# Patient Record
Sex: MALE | Race: WHITE | Employment: FULL TIME | URBAN - NONMETROPOLITAN AREA
[De-identification: names, ages, dates, MRNs, and addresses within clinical notes are randomized per-mention and may not be internally consistent; named-entity substitution may affect disease eponyms.]

---

## 2020-11-11 RX ORDER — FENOFIBRATE 145 MG/1
145 TABLET, COATED ORAL DAILY
COMMUNITY

## 2020-11-11 RX ORDER — OLMESARTAN MEDOXOMIL 40 MG/1
40 TABLET ORAL DAILY
COMMUNITY

## 2020-11-11 RX ORDER — ALBUTEROL SULFATE 90 UG/1
2 AEROSOL, METERED RESPIRATORY (INHALATION) EVERY 6 HOURS PRN
COMMUNITY

## 2020-11-11 RX ORDER — ATORVASTATIN CALCIUM 20 MG/1
20 TABLET, FILM COATED ORAL DAILY
COMMUNITY

## 2020-11-11 RX ORDER — FLUTICASONE PROPIONATE 50 MCG
2 SPRAY, SUSPENSION (ML) NASAL DAILY
COMMUNITY

## 2020-11-11 RX ORDER — SILDENAFIL 100 MG/1
100 TABLET, FILM COATED ORAL
COMMUNITY

## 2020-11-11 RX ORDER — BACLOFEN 20 MG/1
20 TABLET ORAL 3 TIMES DAILY
COMMUNITY

## 2020-11-11 RX ORDER — HYDROCHLOROTHIAZIDE 25 MG/1
25 TABLET ORAL DAILY
COMMUNITY

## 2020-11-11 RX ORDER — AMLODIPINE BESYLATE 10 MG/1
10 TABLET ORAL DAILY
COMMUNITY

## 2020-11-11 RX ORDER — ERGOCALCIFEROL 1.25 MG/1
50000 CAPSULE ORAL WEEKLY
COMMUNITY

## 2020-11-11 RX ORDER — ETODOLAC 400 MG/1
400 TABLET, FILM COATED ORAL 2 TIMES DAILY
COMMUNITY

## 2020-11-11 RX ORDER — OMEPRAZOLE 40 MG/1
40 CAPSULE, DELAYED RELEASE ORAL DAILY
COMMUNITY

## 2020-11-23 ENCOUNTER — INITIAL CONSULT (OUTPATIENT)
Dept: SURGERY | Age: 60
End: 2020-11-23
Payer: COMMERCIAL

## 2020-11-23 VITALS
HEART RATE: 98 BPM | BODY MASS INDEX: 31.47 KG/M2 | SYSTOLIC BLOOD PRESSURE: 138 MMHG | WEIGHT: 171 LBS | HEIGHT: 62 IN | TEMPERATURE: 98 F | DIASTOLIC BLOOD PRESSURE: 66 MMHG

## 2020-11-23 PROCEDURE — 99203 OFFICE O/P NEW LOW 30 MIN: CPT | Performed by: SURGERY

## 2020-11-23 RX ORDER — BISACODYL 5 MG
TABLET, DELAYED RELEASE (ENTERIC COATED) ORAL
Qty: 4 TABLET | Refills: 0 | Status: SHIPPED | OUTPATIENT
Start: 2020-11-23

## 2020-11-23 RX ORDER — POLYETHYLENE GLYCOL 3350 17 G/17G
POWDER, FOR SOLUTION ORAL
Qty: 238 G | Refills: 0 | Status: SHIPPED | OUTPATIENT
Start: 2020-11-23

## 2020-11-23 NOTE — PROGRESS NOTES
[facility] General Surgery   History & Physical  Nisha Nicholas DO  Pt Name: Yvonne Floyd  MRN: [id number]  YOB: 1960  Date of evaluation: 11/23/2020  Primary Care Physician: CARLEE Chadwick - CNP    Chief Complaint:   Chief Complaint   Patient presents with    Abdominal Pain     llq pain-x 5 days-as stopped since         SUBJECTIVE:    History of Present Illness: This is a 61 y.o.  male who presents for evaluation for multiple issues. Pt has a history of alternating constipation/diarrhea/soft stool for several years. Current smoker 1ppd. Had cscope >10yrs ago, does not recall any significant findings, no reports to review at this time. Pt does have intermittent blood per rectum when he is constipation, this resolves on its own. No [address] colorectal CA. Pt also reports longstanding heartburn controlled on prilosec, he has been on this medication for at least 15yrs. Smoker 1ppd, drinks on bottle soda daily and 2 cups coffee daily. Social beer drinker. Pt reports recently he feels as if liquids/solids are getting caught in his chest although he is not regurgitating food, no prior EGD. Past Medical History   has a past medical history of GERD (gastroesophageal reflux disease), Hyperlipidemia, Hypertension, and Prediabetes. Past Surgical History   has a past surgical history that includes Upper gastrointestinal endoscopy; Tonsillectomy and adenoidectomy; and Finger trigger release (2012). Family History  family history is not on file. Social History  Tobacco use:  reports that he has been smoking. He has never used smokeless tobacco.  Alcohol use:  reports current alcohol use. Drug use:  reports no history of drug use.       Medications  Current Medications:   Current Outpatient Medications   Medication Sig Dispense Refill    polyethylene glycol (MIRALAX) 17 GM/SCOOP powder Take per bowel prep instructions 238 g 0    bisacodyl (BISACODYL) 5 MG EC tablet Take per bowel prep Provider, MD   fenofibrate (TRICOR) 145 MG tablet Take 145 mg by mouth daily   Yes Historical Provider, MD   fluticasone (FLONASE) 50 MCG/ACT nasal spray 2 sprays by Each Nostril route daily   Yes Historical Provider, MD   hydroCHLOROthiazide (HYDRODIURIL) 25 MG tablet Take 25 mg by mouth daily   Yes Historical Provider, MD   olmesartan (BENICAR) 40 MG tablet Take 40 mg by mouth daily   Yes Historical Provider, MD   omeprazole (PRILOSEC) 40 MG delayed release capsule Take 40 mg by mouth daily   Yes Historical Provider, MD   albuterol sulfate HFA (VENTOLIN HFA) 108 (90 Base) MCG/ACT inhaler Inhale 2 puffs into the lungs every 6 hours as needed for Wheezing    Historical Provider, MD   sildenafil (VIAGRA) 100 MG tablet Take 100 mg by mouth Take 0.5 tablet (50mg) daily or as needed    Historical Provider, MD       Allergies  Lisinopril      Review of Systems:  General: Denies any fever, chills. Eyes: Denies any changes in vision, diplopia or eye pain  Ears, Nose, Mouth: Denies changes in hearing/tinnitus or drainage from ears, no rhinorrhea or bloody nose, no difficulty chewing  Throat: no difficulty swallowing, no throat pain  Respiratory: Denies any shortness of breath or cough. Cardiac: Denies any chest pain, palpitations, claudication or edema. Gastrointestinal: hearburn, dysphagia, constipation/diarrhea  Genitourinary: Denies any frequency, urgency, hesitancy or incontinence. Musculoskeletal: Denies worsening muscle weakness or recent trauma  Skin: Denies rashes or lesions  Psychiatric: Denies any recent changes in mood or affect  Hematologic: Denies bruising or bleeding easily. PHYSICAL EXAMINATION  Vitals:   Vitals:    11/23/20 1150   BP: 138/66   Pulse: 98   Temp: 98 °F (36.7 °C)       General Appearance:  awake, alert, no acute distress, well developed, well nourished   Skin:  Skin color, texture, turgor normal. No rashes or lesions.   Head/face:  NCAT, face symmetrical  Eyes:  PERRL, no evidence of conjunctivitis or ptosis bilaterally  Ears:  External ears and canals grossly normal, no evidence of otorrhea. Nose/Sinuses:  Nares normal. Septum midline. Mucosa normal. No external drainage noted. Mouth/Neck:  Mucosa moist.  No external oral lesions. Trachea midline. No visible masses. Lungs:  Normal chest expansion, unlabored breathing without accessory muscle use. No audible rales, rhonchi, or wheezing. Cardiovascular: S1S2. No evidence of JVD. No evidence of pulsatile masses in abdomen  Abdomen:  Soft, non-tender, no organomegaly, no masses. Musculoskeletal: No evidence of bony/muscular deformities, trauma, atrophy of either left/right upper/lower extremity. No evidence of digital clubbing or cyanosis. Neurologic:  CN 2-12 grossly intact without obvious deficits. Grossly normal sensation in all extremities. Psychiatric: appropriate judgement and insight, appropriate recall of recent and remote memory, no evidence of depression/anxiety/agitation    DIAGNOSES:   Diagnosis Orders   1. Irritable bowel syndrome with both constipation and diarrhea     2. Smoker     3. Heartburn     4. Dysphagia, unspecified type         PLAN:  · Advised pt to quit smoking  · Schedule for EGD/cscope. The risks include bleeding, infection, scarring, perforation, damage to surrounding tissues, need for further surgery in the future. The benefits, alternatives, complications and procedure details were explained to the pt, all questions were answered.   · Continue prilosec, reduce coffee/soda consumption  · Increase dietary fiber      Medical Decision Making: low complexity     Electronically signed by Chano Blankenship DO on 11/23/2020 at 12:17 PM